# Patient Record
Sex: FEMALE | Race: WHITE | NOT HISPANIC OR LATINO | URBAN - METROPOLITAN AREA
[De-identification: names, ages, dates, MRNs, and addresses within clinical notes are randomized per-mention and may not be internally consistent; named-entity substitution may affect disease eponyms.]

---

## 2023-09-25 PROBLEM — K58.9 IRRITABLE BOWEL SYNDROME WITHOUT DIARRHEA: Status: ACTIVE | Noted: 2023-09-25

## 2023-09-25 PROBLEM — R87.810 CERVICAL HIGH RISK HUMAN PAPILLOMAVIRUS (HPV) DNA TEST POSITIVE: Status: ACTIVE | Noted: 2023-09-25

## 2023-09-25 RX ORDER — ESCITALOPRAM OXALATE 10 MG/1
1 TABLET ORAL NIGHTLY
COMMUNITY
Start: 2023-08-22 | End: 2024-04-29 | Stop reason: WASHOUT

## 2023-09-25 RX ORDER — FAMOTIDINE 20 MG/1
TABLET, FILM COATED ORAL
COMMUNITY

## 2023-09-25 RX ORDER — ONDANSETRON 4 MG/1
TABLET, ORALLY DISINTEGRATING ORAL
COMMUNITY
Start: 2017-11-29 | End: 2024-04-29 | Stop reason: WASHOUT

## 2023-09-25 RX ORDER — ALBUTEROL SULFATE 90 UG/1
AEROSOL, METERED RESPIRATORY (INHALATION)
COMMUNITY

## 2023-09-25 RX ORDER — LEVONORGESTREL AND ETHINYL ESTRADIOL 0.15-0.03
KIT ORAL
COMMUNITY
End: 2023-10-19 | Stop reason: SDUPTHER

## 2023-10-04 ENCOUNTER — APPOINTMENT (OUTPATIENT)
Dept: OBSTETRICS AND GYNECOLOGY | Facility: CLINIC | Age: 28
End: 2023-10-04
Payer: COMMERCIAL

## 2023-10-19 DIAGNOSIS — Z30.41 SURVEILLANCE FOR BIRTH CONTROL, ORAL CONTRACEPTIVES: Primary | ICD-10-CM

## 2023-10-19 NOTE — TELEPHONE ENCOUNTER
Pt needs to have her rx sent to [facility] instead of local pharm. Please send new 90d with 1r to [facility].

## 2023-10-21 RX ORDER — LEVONORGESTREL AND ETHINYL ESTRADIOL 0.15-0.03
KIT ORAL
Qty: 84 TABLET | Refills: 3 | Status: SHIPPED | OUTPATIENT
Start: 2023-10-21 | End: 2024-06-03 | Stop reason: WASHOUT

## 2024-03-03 ENCOUNTER — HOSPITAL ENCOUNTER (EMERGENCY)
Facility: HOSPITAL | Age: 29
Discharge: HOME | End: 2024-03-03
Payer: COMMERCIAL

## 2024-03-03 VITALS
RESPIRATION RATE: 17 BRPM | TEMPERATURE: 98.4 F | SYSTOLIC BLOOD PRESSURE: 111 MMHG | HEIGHT: 63 IN | HEART RATE: 87 BPM | OXYGEN SATURATION: 96 % | BODY MASS INDEX: 29.23 KG/M2 | DIASTOLIC BLOOD PRESSURE: 79 MMHG | WEIGHT: 165 LBS

## 2024-03-03 DIAGNOSIS — Z23 NEED FOR TDAP VACCINATION: ICD-10-CM

## 2024-03-03 DIAGNOSIS — S61.211A LACERATION OF LEFT INDEX FINGER WITHOUT FOREIGN BODY WITHOUT DAMAGE TO NAIL, INITIAL ENCOUNTER: Primary | ICD-10-CM

## 2024-03-03 PROCEDURE — 90715 TDAP VACCINE 7 YRS/> IM: CPT | Performed by: PHYSICIAN ASSISTANT

## 2024-03-03 PROCEDURE — 2500000001 HC RX 250 WO HCPCS SELF ADMINISTERED DRUGS (ALT 637 FOR MEDICARE OP): Performed by: PHYSICIAN ASSISTANT

## 2024-03-03 PROCEDURE — 12001 RPR S/N/AX/GEN/TRNK 2.5CM/<: CPT | Performed by: PHYSICIAN ASSISTANT

## 2024-03-03 PROCEDURE — 99283 EMERGENCY DEPT VISIT LOW MDM: CPT | Mod: 25

## 2024-03-03 PROCEDURE — 2500000004 HC RX 250 GENERAL PHARMACY W/ HCPCS (ALT 636 FOR OP/ED): Performed by: PHYSICIAN ASSISTANT

## 2024-03-03 PROCEDURE — 90471 IMMUNIZATION ADMIN: CPT | Performed by: PHYSICIAN ASSISTANT

## 2024-03-03 RX ORDER — BACITRACIN ZINC 500 UNIT/G
1 OINTMENT (GRAM) TOPICAL 2 TIMES DAILY
Qty: 14 G | Refills: 0 | Status: SHIPPED | OUTPATIENT
Start: 2024-03-03 | End: 2024-03-13

## 2024-03-03 RX ORDER — BACITRACIN ZINC 500 UNIT/G
1 OINTMENT IN PACKET (EA) TOPICAL ONCE
Status: COMPLETED | OUTPATIENT
Start: 2024-03-03 | End: 2024-03-03

## 2024-03-03 RX ORDER — CEPHALEXIN 500 MG/1
500 CAPSULE ORAL 3 TIMES DAILY
Qty: 15 CAPSULE | Refills: 0 | Status: SHIPPED | OUTPATIENT
Start: 2024-03-03 | End: 2024-03-08

## 2024-03-03 RX ADMIN — BACITRACIN 1 APPLICATION: 500 OINTMENT TOPICAL at 10:46

## 2024-03-03 RX ADMIN — TETANUS TOXOID, REDUCED DIPHTHERIA TOXOID AND ACELLULAR PERTUSSIS VACCINE, ADSORBED 0.5 ML: 5; 2.5; 8; 8; 2.5 SUSPENSION INTRAMUSCULAR at 10:44

## 2024-03-03 ASSESSMENT — COLUMBIA-SUICIDE SEVERITY RATING SCALE - C-SSRS
1. IN THE PAST MONTH, HAVE YOU WISHED YOU WERE DEAD OR WISHED YOU COULD GO TO SLEEP AND NOT WAKE UP?: NO
2. HAVE YOU ACTUALLY HAD ANY THOUGHTS OF KILLING YOURSELF?: NO
6. HAVE YOU EVER DONE ANYTHING, STARTED TO DO ANYTHING, OR PREPARED TO DO ANYTHING TO END YOUR LIFE?: NO

## 2024-03-03 ASSESSMENT — PAIN - FUNCTIONAL ASSESSMENT: PAIN_FUNCTIONAL_ASSESSMENT: 0-10

## 2024-03-03 ASSESSMENT — PAIN SCALES - GENERAL: PAINLEVEL_OUTOF10: 5 - MODERATE PAIN

## 2024-03-03 NOTE — ED PROVIDER NOTES
HPI   Chief Complaint   Patient presents with    Finger Laceration     Pt arrived to ED with c/o finger laceration on second digit of left hand lateral aspect. Bleeding under control. Pt alert and oriented x3. Ambulated to triage.       28-year-old female with a laceration of her left second digit that occurred prior to arrival she was cutting sourdough bread.  No other injury nothing makes her better or worse.  Unsure of her last tetanus vaccination.                          No data recorded                   Patient History   Past Medical History:   Diagnosis Date    Personal history of other diseases of the respiratory system     History of asthma     No past surgical history on file.  Family History   Problem Relation Name Age of Onset    Hyperlipidemia Mother      No Known Problems Sister      No Known Problems Brother      Breast cancer Maternal Grandmother      Breast cancer Other          grandmother and great aunt    Breast cancer Maternal Great-Grandmother       Social History     Tobacco Use    Smoking status: Not on file    Smokeless tobacco: Not on file   Substance Use Topics    Alcohol use: Not on file    Drug use: Not on file       Physical Exam   ED Triage Vitals [03/03/24 0957]   Temperature Heart Rate Respirations BP   36.9 °C (98.4 °F) 87 17 111/79      Pulse Ox Temp Source Heart Rate Source Patient Position   96 % Temporal -- --      BP Location FiO2 (%)     -- --       Physical Exam  Vitals reviewed.   Constitutional:       General: She is not in acute distress.     Appearance: Normal appearance. She is normal weight. She is not ill-appearing, toxic-appearing or diaphoretic.   HENT:      Head: Normocephalic and atraumatic.      Right Ear: External ear normal.      Left Ear: External ear normal.      Nose: Nose normal.   Eyes:      Extraocular Movements: Extraocular movements intact.      Conjunctiva/sclera: Conjunctivae normal.      Pupils: Pupils are equal, round, and reactive to light.    Cardiovascular:      Rate and Rhythm: Normal rate.   Pulmonary:      Effort: Pulmonary effort is normal. No respiratory distress.      Breath sounds: No stridor.   Abdominal:      General: There is no distension.   Musculoskeletal:         General: Signs of injury present. No swelling, tenderness or deformity.        Hands:       Cervical back: Normal range of motion.   Skin:     General: Skin is warm.      Capillary Refill: Capillary refill takes less than 2 seconds.      Findings: No rash.   Neurological:      General: No focal deficit present.      Mental Status: She is alert and oriented to person, place, and time. Mental status is at baseline.   Psychiatric:         Mood and Affect: Mood normal.         Behavior: Behavior normal.         Thought Content: Thought content normal.         Judgment: Judgment normal.         ED Course & MDM   Diagnoses as of 03/03/24 1546   Laceration of left index finger without foreign body without damage to nail, initial encounter   Need for Tdap vaccination       Medical Decision Making  Differential is laceration, need for Tdap, infectious process,    Wound repaired and explored ultimately tetanus updated patient given referral to hand as needed but otherwise follow-up with primary care for suture removal in 10 days        Procedure  Laceration Repair    Performed by: Arnie Plunkett PA-C  Authorized by: Arnie Plunkett PA-C    Consent:     Consent obtained:  Verbal    Consent given by:  Patient    Risks, benefits, and alternatives were discussed: yes      Risks discussed:  Pain, infection, need for additional repair, poor cosmetic result, tendon damage and poor wound healing    Alternatives discussed:  No treatment  Universal protocol:     Procedure explained and questions answered to patient or proxy's satisfaction: yes      Immediately prior to procedure, a time out was called: yes      Patient identity confirmed:  Verbally with patient and provided demographic  data  Anesthesia:     Anesthesia method:  Local infiltration    Local anesthetic:  Lidocaine 1% w/o epi  Laceration details:     Location:  Finger    Finger location:  L index finger    Length (cm):  2  Pre-procedure details:     Preparation:  Patient was prepped and draped in usual sterile fashion  Exploration:     Hemostasis achieved with:  Direct pressure    Wound extent: no foreign bodies/material noted, no muscle damage noted and no tendon damage noted      Contaminated: no    Treatment:     Area cleansed with:  Saline and chlorhexidine    Amount of cleaning:  Standard  Skin repair:     Repair method:  Sutures    Suture size:  4-0    Suture technique:  Simple interrupted    Number of sutures:  3  Repair type:     Repair type:  Simple  Post-procedure details:     Dressing:  Splint for protection and non-adherent dressing    Procedure completion:  Tolerated well, no immediate complications       Arnie Plunkett PA-C  03/03/24 1552       Arnie Plunkett PA-C  03/03/24 1551

## 2024-03-27 ENCOUNTER — HOSPITAL ENCOUNTER (OUTPATIENT)
Dept: RADIOLOGY | Facility: CLINIC | Age: 29
Discharge: HOME | End: 2024-03-27
Payer: COMMERCIAL

## 2024-03-27 ENCOUNTER — OFFICE VISIT (OUTPATIENT)
Dept: ORTHOPEDIC SURGERY | Facility: CLINIC | Age: 29
End: 2024-03-27
Payer: COMMERCIAL

## 2024-03-27 VITALS — BODY MASS INDEX: 29.21 KG/M2 | WEIGHT: 164.9 LBS

## 2024-03-27 DIAGNOSIS — S61.211A LACERATION OF LEFT INDEX FINGER, INITIAL ENCOUNTER: Primary | ICD-10-CM

## 2024-03-27 DIAGNOSIS — S61.219A FINGER LACERATION: ICD-10-CM

## 2024-03-27 PROCEDURE — 99213 OFFICE O/P EST LOW 20 MIN: CPT | Performed by: ORTHOPAEDIC SURGERY

## 2024-03-27 PROCEDURE — 99203 OFFICE O/P NEW LOW 30 MIN: CPT | Performed by: ORTHOPAEDIC SURGERY

## 2024-03-27 PROCEDURE — 73130 X-RAY EXAM OF HAND: CPT | Mod: LEFT SIDE | Performed by: ORTHOPAEDIC SURGERY

## 2024-03-27 PROCEDURE — 73130 X-RAY EXAM OF HAND: CPT | Mod: LT

## 2024-03-27 PROCEDURE — 1036F TOBACCO NON-USER: CPT | Performed by: ORTHOPAEDIC SURGERY

## 2024-03-27 ASSESSMENT — PATIENT HEALTH QUESTIONNAIRE - PHQ9
1. LITTLE INTEREST OR PLEASURE IN DOING THINGS: NOT AT ALL
SUM OF ALL RESPONSES TO PHQ9 QUESTIONS 1 AND 2: 0
2. FEELING DOWN, DEPRESSED OR HOPELESS: NOT AT ALL

## 2024-03-27 ASSESSMENT — PAIN DESCRIPTION - DESCRIPTORS: DESCRIPTORS: ACHING

## 2024-03-27 ASSESSMENT — PAIN SCALES - GENERAL: PAINLEVEL_OUTOF10: 0 - NO PAIN

## 2024-03-27 ASSESSMENT — ENCOUNTER SYMPTOMS
DEPRESSION: 0
LOSS OF SENSATION IN FEET: 0
OCCASIONAL FEELINGS OF UNSTEADINESS: 0

## 2024-03-27 ASSESSMENT — PAIN - FUNCTIONAL ASSESSMENT: PAIN_FUNCTIONAL_ASSESSMENT: 0-10

## 2024-03-27 NOTE — PROGRESS NOTES
Subjective      Chief Complaint   Patient presents with    Left Hand - Follow-up     Left  index finger laceration        No surgery found     HPI  This 28-year-old young woman lacerated her left index finger on approximately 3-3-2024 while cutting bread.  She was seen at  emergency department she was evaluated and the laceration was cleansed, was sutured and splinted.  She comes in today in the presence of her mother for discussion regarding further treatment options.  She states that her left index finger pain is now markedly improved and is only a 1/10 and is worse with and aggravated by movement.    CARDIOLOGY:   Negative for chest pain, shortness of breath.   RESPIRATORY:   Negative for chest pain, shortness of breath.   MUSCULOSKELETAL:   See HPI for details.   NEUROLOGY:   Negative for tingling, numbness, weakness.    Objective    There were no vitals filed for this visit.    Physical Exam  GENERAL:          General Appearance:  This is a pleasant patient with appropriate affect, in no acute distress.   DERMATOLOGY:          Skin: skin at the neck, upper and lower back, and trunk is intact. There is no evidence of skin rash, skin breakdown or ulceration, or atrophic skin change.   EXTREMITIES:          Vascular:  Right, left hands and feet are warm with good color and pulses. Right and left calf and thigh are nontender and nonswollen.   NEUROLOGICAL:          Orientation:  Patient is alert and oriented to person, place, time and situation. Right and left upper and lower extremity motor and sensory examinations are intact.      MUSCULOSKELETAL: Neck: Nontender. No pain with range of motion.  Left index finger: The splint is removed.  There is a clean, dry, healed laceration at the PIP joint of the left index finger dorsally.  Sutures have been removed.  There is no drainage.  The patient is able to actively extend and actively flex the left index finger fully.  Sensation is intact.  Color is good.  X-rays of  the left index finger done and read in the office today do not show any evidence of fracture.    No results found.     Kita was seen today for follow-up.  Diagnoses and all orders for this visit:  Laceration of left index finger, initial encounter (Primary)  Options are discussed with the patient in presence of her mother in detail the patient is instructed regarding activity modification and risk for further injury with falling or trauma, ice, infection precautions and keeping this laceration clean, dry and covered, splinting at night and physician directed at home gentle  ROM exercises, and the appropriate use of Tylenol as needed for pain with its potential adverse reactions and side effects. The patient understands.  Return in 2 months for reevaluation or sooner if the patient notices any lack of ability to actively extend her left index finger.  Please note that this report has been produced using speech recognition software.  It may contain errors related to grammar, punctuation or spelling.  Electronically signed, but not reviewed.    Glenn Luevano MD

## 2024-04-29 ENCOUNTER — TELEPHONE (OUTPATIENT)
Dept: OBSTETRICS AND GYNECOLOGY | Facility: CLINIC | Age: 29
End: 2024-04-29
Payer: COMMERCIAL

## 2024-04-29 NOTE — TELEPHONE ENCOUNTER
Est pt calling with c/o rapid weight gain. Pt states she seen PCP who did cbc, cmp and TSH all wnl. Pt will be completing US on Monday. Please see Visit under encounters with [facility] provider. Appt made for 05/22.

## 2024-05-06 ENCOUNTER — HOSPITAL ENCOUNTER (OUTPATIENT)
Dept: RADIOLOGY | Facility: HOSPITAL | Age: 29
Discharge: HOME | End: 2024-05-06
Payer: COMMERCIAL

## 2024-05-06 DIAGNOSIS — Z76.89 PERSONS ENCOUNTERING HEALTH SERVICES IN OTHER SPECIFIED CIRCUMSTANCES: ICD-10-CM

## 2024-05-06 PROCEDURE — 76705 ECHO EXAM OF ABDOMEN: CPT | Performed by: RADIOLOGY

## 2024-05-06 PROCEDURE — 76705 ECHO EXAM OF ABDOMEN: CPT

## 2024-05-22 ENCOUNTER — OFFICE VISIT (OUTPATIENT)
Dept: OBSTETRICS AND GYNECOLOGY | Facility: CLINIC | Age: 29
End: 2024-05-22
Payer: COMMERCIAL

## 2024-05-22 VITALS
WEIGHT: 174 LBS | BODY MASS INDEX: 30.83 KG/M2 | DIASTOLIC BLOOD PRESSURE: 82 MMHG | SYSTOLIC BLOOD PRESSURE: 128 MMHG | HEIGHT: 63 IN

## 2024-05-22 DIAGNOSIS — Z76.89 ESTABLISHING CARE WITH NEW DOCTOR, ENCOUNTER FOR: ICD-10-CM

## 2024-05-22 DIAGNOSIS — L68.0 HIRSUTISM: ICD-10-CM

## 2024-05-22 DIAGNOSIS — Z30.41 SURVEILLANCE FOR BIRTH CONTROL, ORAL CONTRACEPTIVES: ICD-10-CM

## 2024-05-22 DIAGNOSIS — F32.81 PMDD (PREMENSTRUAL DYSPHORIC DISORDER): ICD-10-CM

## 2024-05-22 DIAGNOSIS — F39 MOOD DISORDER (CMS-HCC): Primary | ICD-10-CM

## 2024-05-22 DIAGNOSIS — R63.5 ABNORMAL WEIGHT GAIN: ICD-10-CM

## 2024-05-22 PROCEDURE — 99214 OFFICE O/P EST MOD 30 MIN: CPT | Performed by: OBSTETRICS & GYNECOLOGY

## 2024-05-22 RX ORDER — DROSPIRENONE, ETHINYL ESTRADIOL AND LEVOMEFOLATE CALCIUM AND LEVOMEFOLATE CALCIUM 3-0.02(24)
1 KIT ORAL DAILY
Qty: 28 TABLET | Refills: 11 | Status: SHIPPED | OUTPATIENT
Start: 2024-05-22 | End: 2024-06-03 | Stop reason: WASHOUT

## 2024-05-22 ASSESSMENT — LIFESTYLE VARIABLES
HOW OFTEN DO YOU HAVE SIX OR MORE DRINKS ON ONE OCCASION: NEVER
HOW MANY STANDARD DRINKS CONTAINING ALCOHOL DO YOU HAVE ON A TYPICAL DAY: PATIENT DOES NOT DRINK
AUDIT-C TOTAL SCORE: 0
HOW OFTEN DO YOU HAVE A DRINK CONTAINING ALCOHOL: NEVER
SKIP TO QUESTIONS 9-10: 1

## 2024-05-22 ASSESSMENT — PATIENT HEALTH QUESTIONNAIRE - PHQ9
1. LITTLE INTEREST OR PLEASURE IN DOING THINGS: NOT AT ALL
2. FEELING DOWN, DEPRESSED OR HOPELESS: NOT AT ALL
SUM OF ALL RESPONSES TO PHQ9 QUESTIONS 1 & 2: 0

## 2024-05-22 ASSESSMENT — PAIN SCALES - GENERAL: PAINLEVEL: 0-NO PAIN

## 2024-05-22 ASSESSMENT — ENCOUNTER SYMPTOMS
LOSS OF SENSATION IN FEET: 0
DEPRESSION: 0
OCCASIONAL FEELINGS OF UNSTEADINESS: 0

## 2024-05-22 NOTE — PROGRESS NOTES
GYN OFFICE VISIT    Patient Name:  Kita Watt  :  1995  [facility]  [id number]  [id number]      ASSESSMENT/PLAN:   1. Mood disorder (CMS-HCC)    - Referral to Primary Care; Future    2. PMDD (premenstrual dysphoric disorder)    - Referral to Primary Care; Future  - drospirenone-e.estradioL-lm.FA (Nancy Preston) 3-0.02-0.451 mg (24) (4); Take 1 tablet by mouth once daily.  Dispense: 28 tablet; Refill: 11  - Testosterone, total and free; Future  - FSH; Future  - Luteinizing hormone; Future  - DHEA; Future  - DHEA-Sulfate; Future  - Androstenedione; Future  - Cortisol AM; Future  - Prolactin; Future    3. Surveillance for birth control, oral contraceptives    - drospirenone-e.estradioL-lm.FA (Nancy Preston) 3-0.02-0.451 mg (24) (4); Take 1 tablet by mouth once daily.  Dispense: 28 tablet; Refill: 11    4. Abnormal weight gain    - Referral to Primary Care; Future  - drospirenone-e.estradioL-lm.FA (Nancy Preston) 3-0.02-0.451 mg (24) (4); Take 1 tablet by mouth once daily.  Dispense: 28 tablet; Refill: 11  - Testosterone, total and free; Future  - FSH; Future  - Luteinizing hormone; Future  - DHEA; Future  - DHEA-Sulfate; Future  - Androstenedione; Future  - Cortisol AM; Future  - Prolactin; Future    5. Hirsutism    - Referral to Primary Care; Future  - drospirenone-e.estradioL-lm.FA (Nancy Preston) 3-0.02-0.451 mg (24) (4); Take 1 tablet by mouth once daily.  Dispense: 28 tablet; Refill: 11  - Testosterone, total and free; Future  - FSH; Future  - Luteinizing hormone; Future  - DHEA; Future  - DHEA-Sulfate; Future  - Androstenedione; Future  - Cortisol AM; Future  - Prolactin; Future    6. Establishing care with new doctor, encounter for    - Referral to Primary Care; Future       -Medication education:   All new and/or current medications discussed and reviewed including side effects with patient/caregiver, Understanding:  Caregiver/Patient expressed understanding., Adherence:  Barriers to adherence  identified and discussed if present.  -Preventative hygiene reviewed.   -Options reviewed. Will try Beyaz to cover multiple symptoms  -Skin care reviewed.    RTO in 3 months for annual with pap and FU      Chief Complaint   Patient presents with    Weight Gain     Kita Watt is a 28 y.o. C. Female who presents for weight gain   Patient's last menstrual period was 04/22/2024 (exact date)..  Since January (was on antidepressants). She has been fighting anxiety and depression more since 30lbs gained in about 3-4 months. Not sure if she is being heard. Initial blood work normal (not hormonal).  LBP - chronic,  getting worse.  Skin - a little more fuzz under her chin.  She manages hair over lip.  Periods are regular on the OCP.  She has PMDD.  No dyspareunia out of concern. She uses condoms.  She does not want children at this time. No serious relationship.     Past Medical History:   Diagnosis Date    Abnormal Pap smear of cervix     HPV (human papilloma virus) infection     Laceration of index finger 03/03/2024    left    Personal history of other diseases of the respiratory system     History of asthma       Social History     Tobacco Use    Smoking status: Never    Smokeless tobacco: Never   Vaping Use    Vaping status: Never Used   Substance Use Topics    Alcohol use: Never    Drug use: Yes     Types: Marijuana     Comment: Recreationally. Maybe once every 2 weeks        Family History   Problem Relation Name Age of Onset    Hyperlipidemia Mother Rachel     No Known Problems Sister      No Known Problems Brother      Breast cancer Maternal Grandmother Grandma silvia     Arthritis Maternal Grandmother Grandma silvia     COPD Maternal Grandmother Grandma silvia     Breast cancer Maternal Great-Grandmother      Breast cancer Other          grandmother and great aunt    Cancer Paternal Grandfather pa caron          Current Outpatient Medications:     albuterol 90 mcg/actuation inhaler, Albuterol 90 MCG/ACT AERS   "Refills: 0     Active, Disp: , Rfl:     famotidine (Pepcid) 20 mg tablet, 1 tablet at bedtime as needed Orally Once a day for 30 day(s), Disp: , Rfl:     levonorgestreL-ethinyl estrad (Altavera, 28,) 0.15-0.03 mg tablet, take 1 tablet once daily Orally for 84 days, Disp: 84 tablet, Rfl: 3    mv-min/iron/folic/calcium/vitK (WOMEN'S MULTIVITAMIN ORAL), Take 1 Each by mouth five times a week., Disp: , Rfl:      No Known Allergies       ROS:  WHS - GENERAL:          Chills no.  Fever no.  Loss of Weight no.  Sweats no.  Fatigue no.  Weight gain 10 lbs- short timeframe    WHS - RESPIRATORY:          Shortness of breath no.  WHS - CARDIOVASCULAR:          Chest Pain no.  High Blood Pressure no.  Irregular Heart Beat no.  Low Blood Pressure no.  Rapid Heart Beat no.  Swelling of ankles no.   WHS - GASTROINTESTINAL:          Appetite Poor no.  Bloating no.  Constipation no.  Diarrhea no.  Hemorrhoids no.  Indigestion no.  Nausea no.  Rectal Bleeding no.  Stomach Pain no  Vomiting no.  Vomiting Blood no.      WHS - GENITO-URINARY:          Blood in Urine no.  Frequent Urination no.  Lack of Bladder Control no.  Painful Urination no.  Heavy/Irregular periods no.  Vaginal discharge no.  Vaginal odor no.  Vaginal itching no.  Post-coital bleeding no.      WHS - MUSCLE/JOINT/BONE:          Back no.  Joint pain yes.  Muscle pain no.      WHS - SKIN:          Bruise easily no.  Itching no.  Change in Moles no.  Rash no.  Sore that won't heal no.  Vulvar Lesions no.  Acne yes; manages excess hair  WHS - ROS Update:          Depression or anxiety problems Significant mood swings near period      OBJECTIVE:   /82   Ht 1.6 m (5' 3\")   Wt 78.9 kg (174 lb)   LMP 04/22/2024 (Exact Date)   BMI 30.82 kg/m²   Body mass index is 30.82 kg/m².     Physical Exam  GENERAL:   General Appearance:  well-developed, mild obesity.  well-groomed.  Hygiene:  good.  Ill-appearance:  none.    HEENT: NC/AT head.  Neck is supple.  Speech:  clear.  " Eye contact:  normal.  LUNGS:  Normal effort; no respiratory distress.    HEART: RRR with S1/S2 w/o M/C/R  ABDOMEN: Tenderness:  none.  Distention:  none.   GENITOURINARY - deferred  DERMATOLOGY:  Skin:  various stages of acne  EXTREMITIES: Normal:  no anomalies.  Edema:  none.    NEUROLOGICAL: Orientation:  alert and oriented x 3.   PSYCHOLOGY: Affect:  appropriate.  Mood:  pleasant.     Previous/current LABS reviewed: Yes  Previous/current RADIOLOGY reviewed: Yes    Note: This dictation was generated using Dragon voice recognition software. Please excuse any grammatical or spelling errors that may have occurred using the system.

## 2024-06-03 ENCOUNTER — OFFICE VISIT (OUTPATIENT)
Dept: PRIMARY CARE | Facility: CLINIC | Age: 29
End: 2024-06-03
Payer: COMMERCIAL

## 2024-06-03 VITALS
HEIGHT: 63 IN | DIASTOLIC BLOOD PRESSURE: 80 MMHG | BODY MASS INDEX: 30.72 KG/M2 | OXYGEN SATURATION: 98 % | TEMPERATURE: 98.7 F | SYSTOLIC BLOOD PRESSURE: 110 MMHG | HEART RATE: 91 BPM | WEIGHT: 173.4 LBS

## 2024-06-03 DIAGNOSIS — Z76.89 ENCOUNTER FOR WEIGHT MANAGEMENT: Primary | ICD-10-CM

## 2024-06-03 PROCEDURE — 99213 OFFICE O/P EST LOW 20 MIN: CPT | Performed by: FAMILY MEDICINE

## 2024-06-03 ASSESSMENT — LIFESTYLE VARIABLES: HOW MANY STANDARD DRINKS CONTAINING ALCOHOL DO YOU HAVE ON A TYPICAL DAY: PATIENT DOES NOT DRINK

## 2024-06-03 ASSESSMENT — PAIN SCALES - GENERAL: PAINLEVEL: 0-NO PAIN

## 2024-06-03 NOTE — PROGRESS NOTES
"History Of Present Illness  Kita Watt is a 28 y.o. female presenting for Establish Care (NP/ Est Care- Discuss recent weight gain- 30lbs within 3-5months.)  .    HPI   Gained 30 lbs during the time she was on Lexapro. Weaned off it about 3 months ago and weight has plateaued. However, she expected the weight to\"melt off\" and has not. Diet unrestricted.   Has protien shake and english muffin for breakfast; snacks throughout day on cheese pretzel or other snack that is cheesey; eats dinner. Works out walking daily or HIIT routine a few days a week.     Past Medical History  Patient Active Problem List    Diagnosis Date Noted    Laceration of left index finger, initial encounter 03/27/2024    Cervical high risk human papillomavirus (HPV) DNA test positive 09/25/2023    Irritable bowel syndrome without diarrhea 09/25/2023        Medications  Current Outpatient Medications on File Prior to Visit   Medication Sig    albuterol 90 mcg/actuation inhaler Albuterol 90 MCG/ACT AERS   Refills: 0       Active    famotidine (Pepcid) 20 mg tablet 1 tablet at bedtime as needed Orally Once a day for 30 day(s)    [DISCONTINUED] mv-min/iron/folic/calcium/vitK (WOMEN'S MULTIVITAMIN ORAL) Take 1 Each by mouth five times a week.    [DISCONTINUED] drospirenone-e.estradioL-lm.FA (Nancy Preston) 3-0.02-0.451 mg (24) (4) Take 1 tablet by mouth once daily.    [DISCONTINUED] levonorgestreL-ethinyl estrad (Altavera, Ezequiel,) 0.15-0.03 mg tablet take 1 tablet once daily Orally for 84 days     No current facility-administered medications on file prior to visit.        Surgical History  She has no past surgical history on file.     Social History  She reports that she has never smoked. She has never used smokeless tobacco. She reports current drug use. Drug: Marijuana. She reports that she does not drink alcohol.    Family History  Family History   Problem Relation Name Age of Onset    Hyperlipidemia Mother Rachel     No Known Problems Sister      " "No Known Problems Brother      Breast cancer Maternal Grandmother Grandma silvia     Arthritis Maternal Grandmother Grandma silvia     COPD Maternal Grandmother Grandma silvia     Breast cancer Maternal Great-Grandmother      Breast cancer Other          grandmother and great aunt    Cancer Paternal Grandfather Grandpa caron         Allergies  Patient has no known allergies.    Immunizations  Immunization History   Administered Date(s) Administered    Influenza, injectable, quadrivalent 09/18/2018    Influenza, seasonal, injectable 09/12/2018    Pfizer Gray Cap SARS-CoV-2 02/18/2022    Pfizer Purple Cap SARS-CoV-2 04/17/2021, 05/08/2021    Tdap vaccine, age 7 year and older (BOOSTRIX, ADACEL) 03/11/2016, 03/03/2024        ROS  Negative, except as discussed in HPI     Vitals  /80   Pulse 91   Temp 37.1 °C (98.7 °F)   Ht 1.6 m (5' 3\")   Wt 78.7 kg (173 lb 6.4 oz)   LMP 05/22/2024 (Approximate)   SpO2 98%   BMI 30.72 kg/m²      Physical Exam  Vitals and nursing note reviewed.   Constitutional:       General: She is not in acute distress.     Appearance: Normal appearance.   Cardiovascular:      Rate and Rhythm: Normal rate and regular rhythm.      Heart sounds: Normal heart sounds.   Pulmonary:      Effort: No respiratory distress.      Breath sounds: Normal breath sounds.   Neurological:      General: No focal deficit present.      Mental Status: She is alert. Mental status is at baseline.         Relevant Results  Lab Results   Component Value Date    WBC 5.5 11/04/2019    WBC 3.5 (L) 09/23/2019    HGB 12.7 11/04/2019    HGB 13.3 09/23/2019    HCT 36.6 11/04/2019    HCT 39.2 09/23/2019    MCV 88 11/04/2019    MCV 89 09/23/2019     11/04/2019     09/23/2019     Lab Results   Component Value Date     11/04/2019     09/23/2019    K 3.5 11/04/2019    K 4.1 09/23/2019     11/04/2019     09/23/2019    CO2 24 11/04/2019    CO2 29 09/23/2019    BUN 8 11/04/2019    BUN 10 " "09/23/2019    CREATININE 0.90 11/04/2019    CREATININE 0.83 09/23/2019    CALCIUM 9.5 11/04/2019    CALCIUM 9.9 09/23/2019    PROT 7.5 09/23/2019    PROT 7.6 02/15/2019    BILITOT 1.7 (H) 09/23/2019    BILITOT 0.9 02/15/2019    ALKPHOS 50 09/23/2019    ALKPHOS 58 02/15/2019    ALT 10 09/23/2019    ALT 11 02/15/2019    AST 12 09/23/2019    AST 14 02/15/2019    GLUCOSE 99 11/04/2019    GLUCOSE 85 09/23/2019     No results found for: \"HGBA1C\"  Lab Results   Component Value Date    TSH 1.76 09/23/2019      Lab Results   Component Value Date    CHOL 188 09/23/2019    TRIG 75 09/23/2019    HDL 50.3 09/23/2019           Assessment/Plan   Kita was seen today for establish care.  Diagnoses and all orders for this visit:  Encounter for weight management (Primary)  Comments:  discussed dietary changes for weight loss         Counseling:   Medication education:   -Education:  The patient is counseled regarding potential side-effects of any and all new medications  -Understanding:  Patient expressed understanding of information discussed today  -Adherence:  No barriers to adherence identified    Final treatment plan is a result of shared decision making with patient.         Cesar Whitfield MD   "

## 2024-06-19 ENCOUNTER — APPOINTMENT (OUTPATIENT)
Dept: OBSTETRICS AND GYNECOLOGY | Facility: CLINIC | Age: 29
End: 2024-06-19
Payer: COMMERCIAL

## 2024-06-21 ENCOUNTER — LAB (OUTPATIENT)
Dept: LAB | Facility: LAB | Age: 29
End: 2024-06-21
Payer: COMMERCIAL

## 2024-06-21 DIAGNOSIS — R63.5 ABNORMAL WEIGHT GAIN: ICD-10-CM

## 2024-06-21 DIAGNOSIS — F32.81 PMDD (PREMENSTRUAL DYSPHORIC DISORDER): ICD-10-CM

## 2024-06-21 DIAGNOSIS — L68.0 HIRSUTISM: ICD-10-CM

## 2024-06-21 LAB
CORTIS AM PEAK SERPL-MSCNC: 13.3 UG/DL (ref 5–20)
DHEA-S SERPL-MCNC: 156 UG/DL (ref 65–395)
FSH SERPL-ACNC: 8 IU/L
LH SERPL-ACNC: 3.4 IU/L
PROLACTIN SERPL-MCNC: 10.3 UG/L (ref 3–20)

## 2024-06-21 PROCEDURE — 83002 ASSAY OF GONADOTROPIN (LH): CPT

## 2024-06-21 PROCEDURE — 84402 ASSAY OF FREE TESTOSTERONE: CPT

## 2024-06-21 PROCEDURE — 83001 ASSAY OF GONADOTROPIN (FSH): CPT

## 2024-06-21 PROCEDURE — 36415 COLL VENOUS BLD VENIPUNCTURE: CPT

## 2024-06-21 PROCEDURE — 82157 ASSAY OF ANDROSTENEDIONE: CPT

## 2024-06-21 PROCEDURE — 84146 ASSAY OF PROLACTIN: CPT

## 2024-06-21 PROCEDURE — 82627 DEHYDROEPIANDROSTERONE: CPT

## 2024-06-21 PROCEDURE — 82626 DEHYDROEPIANDROSTERONE: CPT

## 2024-06-21 PROCEDURE — 82533 TOTAL CORTISOL: CPT

## 2024-06-22 DIAGNOSIS — L68.0 HIRSUTISM: ICD-10-CM

## 2024-06-22 DIAGNOSIS — F32.81 PMDD (PREMENSTRUAL DYSPHORIC DISORDER): Primary | ICD-10-CM

## 2024-06-22 DIAGNOSIS — Z30.41 SURVEILLANCE FOR BIRTH CONTROL, ORAL CONTRACEPTIVES: ICD-10-CM

## 2024-06-22 DIAGNOSIS — R63.5 ABNORMAL WEIGHT GAIN: ICD-10-CM

## 2024-06-22 RX ORDER — DROSPIRENONE, ETHINYL ESTRADIOL AND LEVOMEFOLATE CALCIUM AND LEVOMEFOLATE CALCIUM 3-0.02(24)
1 KIT ORAL DAILY
Qty: 28 TABLET | Refills: 11 | Status: SHIPPED | OUTPATIENT
Start: 2024-06-22

## 2024-06-22 NOTE — PROGRESS NOTES
Called with complaints of to the pharmacy for birth control and it had been canceled.  BS was prescribed on May 22 by this provider.  It was discontinued on [place] 3 by her Dr. Whitfield.  Instead they did not discuss birth control.  Prescription was renewed at this time for her.

## 2024-06-26 LAB
ANDROST SERPL-MCNC: 0.63 NG/ML (ref 0.26–2.14)
DHEA SERPL-MCNC: 2.48 NG/ML (ref 1.33–7.78)

## 2024-06-27 LAB
TESTOSTERONE FREE (CHAN): 2.1 PG/ML (ref 0.1–6.4)
TESTOSTERONE,TOTAL,LC-MS/MS: 21 NG/DL (ref 2–45)

## 2024-08-05 DIAGNOSIS — Z30.41 SURVEILLANCE FOR BIRTH CONTROL, ORAL CONTRACEPTIVES: Primary | ICD-10-CM

## 2024-08-05 RX ORDER — LEVONORGESTREL AND ETHINYL ESTRADIOL 0.15-0.03
1 KIT ORAL DAILY
COMMUNITY
End: 2024-08-05 | Stop reason: SDUPTHER

## 2024-08-07 RX ORDER — LEVONORGESTREL AND ETHINYL ESTRADIOL 0.15-0.03
1 KIT ORAL DAILY
Qty: 28 TABLET | Refills: 11 | Status: SHIPPED | OUTPATIENT
Start: 2024-08-07 | End: 2024-09-04

## 2024-08-22 ENCOUNTER — OFFICE VISIT (OUTPATIENT)
Dept: OBSTETRICS AND GYNECOLOGY | Facility: CLINIC | Age: 29
End: 2024-08-22
Payer: COMMERCIAL

## 2024-08-22 VITALS
WEIGHT: 166 LBS | DIASTOLIC BLOOD PRESSURE: 73 MMHG | BODY MASS INDEX: 29.41 KG/M2 | SYSTOLIC BLOOD PRESSURE: 111 MMHG | HEIGHT: 63 IN

## 2024-08-22 DIAGNOSIS — Z30.41 SURVEILLANCE FOR BIRTH CONTROL, ORAL CONTRACEPTIVES: ICD-10-CM

## 2024-08-22 DIAGNOSIS — Z01.419 WELL WOMAN EXAM: Primary | ICD-10-CM

## 2024-08-22 DIAGNOSIS — F32.81 PMDD (PREMENSTRUAL DYSPHORIC DISORDER): ICD-10-CM

## 2024-08-22 DIAGNOSIS — Z12.4 CERVICAL CANCER SCREENING: ICD-10-CM

## 2024-08-22 DIAGNOSIS — L68.0 HIRSUTISM: ICD-10-CM

## 2024-08-22 DIAGNOSIS — R63.5 ABNORMAL WEIGHT GAIN: ICD-10-CM

## 2024-08-22 DIAGNOSIS — Z11.3 SCREEN FOR STD (SEXUALLY TRANSMITTED DISEASE): ICD-10-CM

## 2024-08-22 PROCEDURE — 99395 PREV VISIT EST AGE 18-39: CPT | Performed by: OBSTETRICS & GYNECOLOGY

## 2024-08-22 PROCEDURE — 87491 CHLMYD TRACH DNA AMP PROBE: CPT | Performed by: OBSTETRICS & GYNECOLOGY

## 2024-08-22 PROCEDURE — 87661 TRICHOMONAS VAGINALIS AMPLIF: CPT | Performed by: OBSTETRICS & GYNECOLOGY

## 2024-08-22 PROCEDURE — 3008F BODY MASS INDEX DOCD: CPT | Performed by: OBSTETRICS & GYNECOLOGY

## 2024-08-22 RX ORDER — DROSPIRENONE, ETHINYL ESTRADIOL AND LEVOMEFOLATE CALCIUM AND LEVOMEFOLATE CALCIUM 3-0.02(24)
1 KIT ORAL DAILY
Qty: 28 TABLET | Refills: 11 | Status: SHIPPED | OUTPATIENT
Start: 2024-08-22

## 2024-08-22 ASSESSMENT — SOCIAL DETERMINANTS OF HEALTH (SDOH)

## 2024-08-22 ASSESSMENT — PAIN SCALES - GENERAL: PAINLEVEL: 0-NO PAIN

## 2024-08-22 ASSESSMENT — LIFESTYLE VARIABLES
HOW OFTEN DO YOU HAVE SIX OR MORE DRINKS ON ONE OCCASION: NEVER
AUDIT-C TOTAL SCORE: 0
HOW MANY STANDARD DRINKS CONTAINING ALCOHOL DO YOU HAVE ON A TYPICAL DAY: PATIENT DOES NOT DRINK
HOW OFTEN DO YOU HAVE A DRINK CONTAINING ALCOHOL: NEVER
SKIP TO QUESTIONS 9-10: 1

## 2024-08-22 ASSESSMENT — PATIENT HEALTH QUESTIONNAIRE - PHQ9
2. FEELING DOWN, DEPRESSED OR HOPELESS: NOT AT ALL
1. LITTLE INTEREST OR PLEASURE IN DOING THINGS: NOT AT ALL
SUM OF ALL RESPONSES TO PHQ9 QUESTIONS 1 & 2: 0

## 2024-08-22 ASSESSMENT — ENCOUNTER SYMPTOMS
OCCASIONAL FEELINGS OF UNSTEADINESS: 0
DEPRESSION: 0
LOSS OF SENSATION IN FEET: 0

## 2024-08-22 NOTE — PROGRESS NOTES
Patient Name:  Kita Watt  :  1995  MR #:  [id number]  Acct #:  [id number]      Assessment:    1. Well woman exam      2. Cervical cancer screening    - THINPREP PAP TEST  - C. Trachomatis / N. Gonorrhoeae, Amplified Detection  - Trichomonas vaginalis, Nucleic Acid Detection    3. Surveillance for birth control, oral contraceptives    - drospirenone-e.estradioL-lm.FA (Nancy Preston) 3-0.02-0.451 mg (24) (4); Take 1 tablet by mouth once daily.  Dispense: 28 tablet; Refill: 11    4. Screen for STD (sexually transmitted disease)    - THINPREP PAP TEST  - C. Trachomatis / N. Gonorrhoeae, Amplified Detection  - Trichomonas vaginalis, Nucleic Acid Detection    5. PMDD (premenstrual dysphoric disorder)    - drospirenone-e.estradioL-lm.FA (Nancy Preston) 3-0.02-0.451 mg (24) (4); Take 1 tablet by mouth once daily.  Dispense: 28 tablet; Refill: 11    6. Hirsutism    - drospirenone-e.estradioL-lm.FA (Nancy Preston) 3-0.02-0.451 mg (24) (4); Take 1 tablet by mouth once daily.  Dispense: 28 tablet; Refill: 11    7. Abnormal weight gain    - drospirenone-e.estradioL-lm.FA (Nancy Preston) 3-0.02-0.451 mg (24) (4); Take 1 tablet by mouth once daily.  Dispense: 28 tablet; Refill: 11        -PAP every 3 years if normal. Next pap due Now  -STI screening G/C/T obtained.  -Birth control counseling:  Barrier methods of contraception encouraged for BC and for prevention of STIs.   -Preventative hygiene habits.  -Health promoting habits: Exercise ie. aerobics, yoga/pilates, weight bearing exercises 3 to 5 days/week. Advised that muscle weighs more than fat.     Diet ie. Meals with balanced portions of healthy fats, complex carbohydrates, and protein;  Take in adequate calcium and vitamin D3; drink plenty of water each day.   -Medication education:  Education:  All new and/or current medications discussed and reviewed including side effects with patient/caregiver, Understanding:  Caregiver/Patient expressed understanding.,  Adherence:  Barriers to adherence identified and discussed if present.        Chief Complaint:  Annual exam    Subjective:  HPI: 29 yo CF G0 for annual. She does much better on drosperinone OCP vs levonorgestol. Mood is much better.  Exercising 2-3 x/wk.  She doesn't understand her weight gain.           Birth control: OCP - doing well on Beyaz        Cycles: regular lasts 5 days every 28 days    GYNH:   Yes, first onset 12  LMP:  Patient's last menstrual period was 2024 (exact date).  HPV Vaccine Yes - .  Sexual activity Yes - . [place] Yes - .  Orientation: straight  Birth control history: OCP     Past Medical History:   Diagnosis Date    Abnormal Pap smear of cervix     Anxiety     GERD (gastroesophageal reflux disease)     Headache     HPV (human papilloma virus) infection     Laceration of index finger 2024    left    Personal history of other diseases of the respiratory system     History of asthma        History reviewed. No pertinent surgical history.     OB History          0    Para   0    Term   0       0    AB   0    Living   0         SAB   0    IAB   0    Ectopic   0    Multiple   0    Live Births   0                  Prior to Admission medications    Medication Sig Start Date End Date Taking? Authorizing Provider   albuterol 90 mcg/actuation inhaler Albuterol 90 MCG/ACT AERS   Refills: 0       Active   Yes Historical Provider, MD   drospirenone-e.estradioL-lm.FA (Nancy Preston) 3-0.02-0.451 mg (24) (4) Take 1 tablet by mouth once daily. 24  Yes Irene White DO   famotidine (Pepcid) 20 mg tablet 1 tablet at bedtime as needed Orally Once a day for 30 day(s)   Yes Historical Provider, MD   levonorgestreL-ethinyl estrad (Nordette) 0.15-0.03 mg tablet Take 1 tablet by mouth once daily for 28 days. 24  Irene White DO       No Known Allergies    ROS:   [facility] - WOMEN ONLY:          Breast Lump No acute changes.  Hot Flashes Occasional.  Painful [place]  no.  Vaginal Discharge no.      [facility] - ROS Update:          Unexplained Weight Change no.  Pain anywhere in your body no.  Black or bloody stools no.  Problems with urination no.  Rashes or sores no.  Sexual problems no.  Depression or anxiety problems no.  Do you feel threatened by anyone No.        Objective:  Physical Examination:      GENERAL:          General Appearance:  well-developed, well-nourished, no functional handicap, well-groomed.          Hygiene:  good.          Ill-appearance:  none.          Mental Status:  alert and oriented.          Mood/Affect:  pleasant, appropriate.          Speech:  clear.          Eye contact:  normal.          Appears stated age:  yes.          Labs reviewed during visit  yes.          Diagnostic imaging reviewed with patient  yes.      LUNGS:          Effort:  no respiratory distress.      HEART:         HRRR with S1/S2 w/o M/C/R     BREASTS:          General:  no masses, no tenderness, no skin changes, no nipple abnormality, and no axillary lymphadenopathy.      ABDOMEN:          Tenderness:  none.          Distention:  none.      GENITOURINARY - FEMALE:          Bladder:  normal .          Pelvic support defects:  not seen .          External genitalia:  Normal.          Urethra:  Normal.          Vagina:  no lesions, moderate discharge, STI screens collected Yes -. Pap collected         Cervix/ cuff:  normal appearance .          Uterus:  normal size/shape/consistency, non tender.          Adnexa:  normal , non tender.      DERMATOLOGY:          Skin:  normal.      EXTREMITIES:          Normal:  no anomalies.          Edema:  none.      NEUROLOGICAL:          Orientation:  alert and oriented x 3.      PSYCHOLOGY:          Affect:  appropriate.          Mood:  pleasant.

## 2024-08-24 LAB
C TRACH RRNA SPEC QL NAA+PROBE: NEGATIVE
N GONORRHOEA DNA SPEC QL PROBE+SIG AMP: NEGATIVE
T VAGINALIS RRNA SPEC QL NAA+PROBE: NEGATIVE

## 2024-09-04 LAB
CYTOLOGY CMNT CVX/VAG CYTO-IMP: NORMAL
LAB AP CONTRACEPTIVE HISTORY: NORMAL
LAB AP HPV GENOTYPE QUESTION: YES
LAB AP HPV HR: NORMAL
LAB AP PAP ADDITIONAL TESTS: NORMAL
LAB AP PREVIOUS ABNORMAL HISTORY: NORMAL
LABORATORY COMMENT REPORT: NORMAL
PATH REPORT.RELEVANT HX SPEC: NORMAL
PATH REPORT.TOTAL CANCER: NORMAL

## 2025-07-02 ENCOUNTER — APPOINTMENT (OUTPATIENT)
Dept: PRIMARY CARE | Facility: CLINIC | Age: 30
End: 2025-07-02
Payer: COMMERCIAL

## 2025-07-15 ENCOUNTER — APPOINTMENT (OUTPATIENT)
Dept: PRIMARY CARE | Facility: CLINIC | Age: 30
End: 2025-07-15
Payer: COMMERCIAL

## 2025-07-15 VITALS
SYSTOLIC BLOOD PRESSURE: 112 MMHG | DIASTOLIC BLOOD PRESSURE: 77 MMHG | TEMPERATURE: 96.9 F | OXYGEN SATURATION: 91 % | BODY MASS INDEX: 27.03 KG/M2 | HEART RATE: 75 BPM | WEIGHT: 152.6 LBS

## 2025-07-15 DIAGNOSIS — F40.240 CLAUSTROPHOBIA: ICD-10-CM

## 2025-07-15 DIAGNOSIS — Z76.89 ENCOUNTER TO ESTABLISH CARE WITH NEW DOCTOR: ICD-10-CM

## 2025-07-15 DIAGNOSIS — Z13.1 DIABETES MELLITUS SCREENING: ICD-10-CM

## 2025-07-15 DIAGNOSIS — Z00.00 LABORATORY EXAMINATION ORDERED AS PART OF A ROUTINE GENERAL MEDICAL EXAMINATION: ICD-10-CM

## 2025-07-15 DIAGNOSIS — R79.89 ABNORMAL TSH: ICD-10-CM

## 2025-07-15 DIAGNOSIS — R53.83 OTHER FATIGUE: ICD-10-CM

## 2025-07-15 DIAGNOSIS — G43.119 INTRACTABLE MIGRAINE WITH AURA WITHOUT STATUS MIGRAINOSUS: Primary | ICD-10-CM

## 2025-07-15 DIAGNOSIS — Z00.00 ROUTINE GENERAL MEDICAL EXAMINATION AT A HEALTH CARE FACILITY: ICD-10-CM

## 2025-07-15 DIAGNOSIS — E78.00 HIGH CHOLESTEROL: ICD-10-CM

## 2025-07-15 DIAGNOSIS — G44.52 NEW DAILY PERSISTENT HEADACHE: ICD-10-CM

## 2025-07-15 PROBLEM — G43.109 OCULAR MIGRAINE: Status: ACTIVE | Noted: 2025-07-15

## 2025-07-15 PROBLEM — G43.109 OCULAR MIGRAINE: Status: RESOLVED | Noted: 2025-07-15 | Resolved: 2025-07-15

## 2025-07-15 PROCEDURE — 99214 OFFICE O/P EST MOD 30 MIN: CPT | Performed by: STUDENT IN AN ORGANIZED HEALTH CARE EDUCATION/TRAINING PROGRAM

## 2025-07-15 PROCEDURE — 99395 PREV VISIT EST AGE 18-39: CPT | Performed by: STUDENT IN AN ORGANIZED HEALTH CARE EDUCATION/TRAINING PROGRAM

## 2025-07-15 PROCEDURE — 1036F TOBACCO NON-USER: CPT | Performed by: STUDENT IN AN ORGANIZED HEALTH CARE EDUCATION/TRAINING PROGRAM

## 2025-07-15 RX ORDER — CLONAZEPAM 0.5 MG/1
0.5 TABLET ORAL ONCE
Qty: 1 TABLET | Refills: 0 | Status: SHIPPED | OUTPATIENT
Start: 2025-07-15 | End: 2025-07-15

## 2025-07-15 RX ORDER — SUMATRIPTAN SUCCINATE 50 MG/1
50 TABLET ORAL ONCE AS NEEDED
Qty: 27 TABLET | Refills: 3 | Status: SHIPPED | OUTPATIENT
Start: 2025-07-15 | End: 2026-07-15

## 2025-07-15 ASSESSMENT — ENCOUNTER SYMPTOMS
DIZZINESS: 0
WHEEZING: 0
VOMITING: 0
COUGH: 0
ABDOMINAL PAIN: 0
ADENOPATHY: 0
DIARRHEA: 0
OCCASIONAL FEELINGS OF UNSTEADINESS: 0
COLOR CHANGE: 0
SHORTNESS OF BREATH: 0
CHILLS: 0
DEPRESSION: 0
HEADACHES: 0
LOSS OF SENSATION IN FEET: 0
NAUSEA: 0
CONSTIPATION: 0
FATIGUE: 0
FEVER: 0
DIFFICULTY URINATING: 0

## 2025-07-15 ASSESSMENT — PATIENT HEALTH QUESTIONNAIRE - PHQ9
1. LITTLE INTEREST OR PLEASURE IN DOING THINGS: NOT AT ALL
2. FEELING DOWN, DEPRESSED OR HOPELESS: NOT AT ALL
SUM OF ALL RESPONSES TO PHQ9 QUESTIONS 1 AND 2: 0

## 2025-07-15 NOTE — PATIENT INSTRUCTIONS
Please make a follow up appointment in  1 month for your: headache    Or follow up with us if any new, changing, or concerning symptoms occur!    If you had lab work ordered today, please complete it at your earliest convenience. There is a cholesterol test ordered with your lab work, please make sure to get it fasting (no snacks after midnight then complete at 8 am the next day. Plain coffee, tea or water is ok! No milk or sugar in your morning beverage!) Please don't take any biotin supplements before your lab test. You do not need to bring in any paperwork to get this blood work done. You can walk-in or make an appointment at any  [facility] lab location. Your list of tests on your summary are for your personal records. You can request a copay estimate from the  on the labs you are ordered.    If you had referrals placed today, you can call the scheduling phone number on your after visit summary to schedule which is: [phone]. Referrals include your preventative health screening tests such as colonoscopy, mammograms, or bone density scans. If you have any questions about billing or fees for this or other office appointments, please be advised that you will need to contact the billing department.

## 2025-07-15 NOTE — PROGRESS NOTES
Put in a Rx for 1 time dose of klonopin for claustrophobia for her MRI. Pt given written and verbal instructions. Take 30mins before procedure, do not operate heavy machinery or drive, do not mix with other sedating medications.    PDMP reviewed, risk and benefit of medication assessed, benefit of one time dose outweighs the risk. Pt does not have not red flag history of abuse or diversion and is not on any contraindicated medications.

## 2025-07-15 NOTE — PROGRESS NOTES
[facility]  [address], [address]  [place] [address]    Subjective     Patient ID: Kita Watt is a 29 y.o. female who presents for  Establish Care     Headaches. Had this issue since she was a child. She has frequent headaches. Now she is having more frequent headaches for the past week, they have been progressively worse over the month. She thinks that it is from stress. She can sleep and it will go away. But she is getting them in the morning and all day now for the past 7 days. She tried OTC meds. She has also had sx such as passing out and visual disturbance. She was once dx with ocular migraines. In the past was on a epilepsy med for it and a muscle relaxer. Describes all over the head and in the back. Gets some blurry vision but it comes back to normal.     Thyroid concerns. Having some mood issues. Ast time she had some fatigue, low mood she had a thyroid dysfunction. She was on meds for this a long time ago.     At one time was on lexapro but gained 40 lbs. She was taken off this medication. She was then found to have low thyroid levels.        Review of Systems   Constitutional:  Negative for chills, fatigue and fever.   HENT:  Negative for congestion.    Eyes:  Negative for visual disturbance.   Respiratory:  Negative for cough, shortness of breath and wheezing.    Cardiovascular:  Negative for chest pain.   Gastrointestinal:  Negative for abdominal pain, constipation, diarrhea, nausea and vomiting.   Genitourinary:  Negative for difficulty urinating.   Musculoskeletal:  Negative for gait problem.   Skin:  Negative for color change.   Neurological:  Negative for dizziness, syncope and headaches.   Hematological:  Negative for adenopathy.       Social History[1]  Family History[2]  RX Allergies[3]   Current Medications[4]    /77 (BP Location: Right arm, Patient Position: Sitting, BP Cuff Size: Adult)   Pulse 75   Temp 36.1 °C (96.9 °F) (Temporal)   Wt 69.2  kg (152 lb 9.6 oz)   SpO2 91%   BMI 27.03 kg/m²      Objective   Physical Exam  Vitals reviewed.   Constitutional:       General: She is not in acute distress.     Appearance: Normal appearance. She is not ill-appearing, toxic-appearing or diaphoretic.   HENT:      Head: Normocephalic and atraumatic.      Right Ear: Tympanic membrane, ear canal and external ear normal. There is no impacted cerumen.      Left Ear: Tympanic membrane, ear canal and external ear normal. There is no impacted cerumen.      Nose: Nose normal. No congestion.      Mouth/Throat:      Mouth: Mucous membranes are moist.      Pharynx: Oropharynx is clear. No oropharyngeal exudate or posterior oropharyngeal erythema.   Eyes:      General:         Right eye: No discharge.         Left eye: No discharge.      Extraocular Movements: Extraocular movements intact.      Conjunctiva/sclera: Conjunctivae normal.      Pupils: Pupils are equal, round, and reactive to light.   Neck:      Comments: No thyromegaly  Cardiovascular:      Rate and Rhythm: Normal rate and regular rhythm.      Pulses: Normal pulses.      Heart sounds: Normal heart sounds. No murmur heard.     No friction rub. No gallop.   Pulmonary:      Effort: Pulmonary effort is normal. No respiratory distress.      Breath sounds: Normal breath sounds. No stridor. No wheezing, rhonchi or rales.   Chest:      Chest wall: No tenderness.   Abdominal:      General: Abdomen is flat. Bowel sounds are normal. There is no distension.      Palpations: Abdomen is soft. There is no mass.      Tenderness: There is no abdominal tenderness. There is no guarding or rebound.      Hernia: No hernia is present.   Musculoskeletal:         General: No swelling or tenderness. Normal range of motion.      Cervical back: Normal range of motion and neck supple. No rigidity or tenderness.      Right lower leg: No edema.      Left lower leg: No edema.   Lymphadenopathy:      Cervical: No cervical adenopathy.   Skin:    "  General: Skin is warm and dry.      Coloration: Skin is not jaundiced or pale.   Neurological:      General: No focal deficit present.      Mental Status: She is alert and oriented to person, place, and time.      Cranial Nerves: No cranial nerve deficit.      Sensory: No sensory deficit.      Motor: No weakness.      Coordination: Coordination normal.      Gait: Gait normal.      Deep Tendon Reflexes: Reflexes normal.   Psychiatric:         Mood and Affect: Mood normal.         Behavior: Behavior normal.         Thought Content: Thought content normal.         Judgment: Judgment normal.           Labs:   Lab Results   Component Value Date    WBC 5.5 11/04/2019    HGB 12.7 11/04/2019    HCT 36.6 11/04/2019     11/04/2019    TSH 1.76 09/23/2019     Lab Results   Component Value Date     11/04/2019    K 3.5 11/04/2019     11/04/2019    BUN 8 11/04/2019    CREATININE 0.90 11/04/2019    GLUCOSE 99 11/04/2019    CALCIUM 9.5 11/04/2019    PROT 7.5 09/23/2019    BILITOT 1.7 (H) 09/23/2019    ALKPHOS 50 09/23/2019    AST 12 09/23/2019    ALT 10 09/23/2019     Lab Results   Component Value Date    CHOL 188 09/23/2019      Lab Results   Component Value Date    TRIG 75 09/23/2019      Lab Results   Component Value Date    HDL 50.3 09/23/2019     No results found for: \"LDLCALC\"   Lab Results   Component Value Date    VLDL 15 09/23/2019    No components found for: \"CHOLHDLRATI0\"    No data recorded    Imaging/Testing: US abdomen limited liver  Narrative: Interpreted By:  Zheng Monroy,   STUDY:  US ABDOMEN LIMITED LIVER;  5/6/2024 8:04 am      INDICATION:  Signs/Symptoms:Z76.89.      COMPARISON:  None.      ACCESSION NUMBER(S):  [id number]      ORDERING CLINICIAN:  HEAVENLY MARS      TECHNIQUE:  Multiple images of the right upper quadrant were obtained.      FINDINGS:  LIVER:  The liver measures 15.0 cm and is grossly unremarkable and free of  any focal lesions.          GALLBLADDER:  The gallbladder is " "nondistended, and demonstrates no evidence of  gallstones, wall thickening or surrounding fluid. The gallbladder  wall thickness is 1.7 mm. Sonographic Perkins's sign is negative.          BILE DUCTS:  No evidence of intra or extrahepatic biliary dilatation is  identified; the common bile duct measures 2.3 mm.      PANCREAS:  The visualized pancreas is unremarkable in appearance.      RIGHT KIDNEY:  The right kidney measures 10.8 cm in length. The renal cortical  echogenicity and thickness are within normal limit.  No  hydronephrosis or renal calculi are seen.      Impression: Grossly unremarkable right upper quadrant ultrasound.      MACRO:  None      Signed by: Zheng Monroy 5/7/2024 6:09 AM  Dictation workstation:   [id number]    Assessment/Plan   Problem List Items Addressed This Visit       Other fatigue    - check labs  - possibly 2/2 thyroid         Relevant Orders    Comprehensive Metabolic Panel    CBC and Auto Differential    Hemoglobin A1C    High cholesterol    - recheck lipids  - likely 2/2 possible thyroid dysfunction + lifestyle         Relevant Orders    Lipid Panel    Abnormal TSH    - having sx mood changes, fatigue, cold  - check TSH  - would have low threshold to treat dt sx         Relevant Orders    TSH    T4, free    Intractable migraine with aura without status migrainosus - Primary    - chronic issue, initial encounter  - no improvement with OTC  - trial imitrex, if not relief can try another \"triptan\" then nurtec  - continue sleep hygiene, stress relief  - may need ppx medication if prn ineffective         Relevant Medications    SUMAtriptan (Imitrex) 50 mg tablet     Other Visit Diagnoses         New daily persistent headache        Relevant Orders    MR brain wo IV contrast      Laboratory examination ordered as part of a routine general medical examination          Diabetes mellitus screening        Relevant Orders    Hemoglobin A1C      Encounter to establish care with new doctor        "   Routine general medical examination at a health care facility              [facility]  - acute issue, uncontrolled, initial encounter  - has hx of migraines, but headache freq and severity has changed  - in the AM and all throught the day, not getting better with sleep  - has hx of concussions in the past  - concern for mass vs bleed vs neuro change  - MRI as above  - if worsen, with neuro sx - go to ER    As part of today's Preventative Visit, an age and gender-appropriate history and physical was performed, as documented below. Counseling and anticipatory guidance were performed, and risk factor reduction interventions (including [place] Preventative Services Task Force recommended screening tests) were utilized/ordered as outlined in the above Assessment and Plan. All patient medications were reviewed, and refilled if necessary. Patient and I discussed diet/nutrition, lifestyle modifications, safety, medication indications and side effects, and health goals.    current treatment plan is effective, no change in therapy, orders and follow up as documented in EMR, lab results reviewed with patient, repeat labs ordered prior to next appointment, reviewed compliance with lifestyle measures, reviewed diet, exercise and weight control, reviewed medications and side effects in detail     Return visit in 1 months.  Migraine headache  Thyroid  dizziness         BERKLEY AMATO MD, [facility]  Department of Family Medicine of [facility] - Primary Care         [1]   Social History  Tobacco Use    Smoking status: Never    Smokeless tobacco: Never   Vaping Use    Vaping status: Never Used   Substance Use Topics    Alcohol use: Never    Drug use: Not Currently     Types: Marijuana     Comment: Recreationally. Maybe once every 2 weeks   [2]   Family History  Problem Relation Name Age of Onset    Hyperlipidemia Mother Rachel     No Known Problems Sister      No Known Problems Brother      Breast cancer  Maternal Grandmother Grandma silvia     Arthritis Maternal Grandmother Grandma silvia     COPD Maternal Grandmother Grandma silvia     Breast cancer Maternal Great-Grandmother      Breast cancer Other          grandmother and great aunt    Cancer Paternal Grandfather Grandpa caron     Cancer Other Paternal grandmother 80 - 99   [3] No Known Allergies  [4]   Current Outpatient Medications   Medication Sig Dispense Refill    albuterol 90 mcg/actuation inhaler Albuterol 90 MCG/ACT AERS   Refills: 0       Active      drospirenone-e.estradioL-lm.FA (Nancy Preston) 3-0.02-0.451 mg (24) (4) Take 1 tablet by mouth once daily. 28 tablet 11    famotidine (Pepcid) 20 mg tablet 1 tablet at bedtime as needed Orally Once a day for 30 day(s)      SUMAtriptan (Imitrex) 50 mg tablet Take 1 tablet (50 mg) by mouth 1 time if needed for migraine. May repeat after 2 hours. 27 tablet 3     No current facility-administered medications for this visit.

## 2025-07-15 NOTE — ASSESSMENT & PLAN NOTE
"- chronic issue, initial encounter  - no improvement with OTC  - trial imitrex, if not relief can try another \"triptan\" then nurtec  - continue sleep hygiene, stress relief  - may need ppx medication if prn ineffective  "

## 2025-07-16 LAB
ALBUMIN SERPL-MCNC: 4.3 G/DL (ref 3.6–5.1)
ALP SERPL-CCNC: 65 U/L (ref 31–125)
ALT SERPL-CCNC: 8 U/L (ref 6–29)
ANION GAP SERPL CALCULATED.4IONS-SCNC: 9 MMOL/L (CALC) (ref 7–17)
AST SERPL-CCNC: 13 U/L (ref 10–30)
BASOPHILS # BLD AUTO: 22 CELLS/UL (ref 0–200)
BASOPHILS NFR BLD AUTO: 0.5 %
BILIRUB SERPL-MCNC: 0.7 MG/DL (ref 0.2–1.2)
BUN SERPL-MCNC: 12 MG/DL (ref 7–25)
CALCIUM SERPL-MCNC: 9.6 MG/DL (ref 8.6–10.2)
CHLORIDE SERPL-SCNC: 103 MMOL/L (ref 98–110)
CHOLEST SERPL-MCNC: 235 MG/DL
CHOLEST/HDLC SERPL: 3.1 (CALC)
CO2 SERPL-SCNC: 26 MMOL/L (ref 20–32)
CREAT SERPL-MCNC: 0.77 MG/DL (ref 0.5–0.96)
EGFRCR SERPLBLD CKD-EPI 2021: 107 ML/MIN/1.73M2
EOSINOPHIL # BLD AUTO: 22 CELLS/UL (ref 15–500)
EOSINOPHIL NFR BLD AUTO: 0.5 %
ERYTHROCYTE [DISTWIDTH] IN BLOOD BY AUTOMATED COUNT: 12.4 % (ref 11–15)
EST. AVERAGE GLUCOSE BLD GHB EST-MCNC: 108 MG/DL
EST. AVERAGE GLUCOSE BLD GHB EST-SCNC: 6 MMOL/L
GLUCOSE SERPL-MCNC: 83 MG/DL (ref 65–99)
HBA1C MFR BLD: 5.4 %
HCT VFR BLD AUTO: 39.7 % (ref 35–45)
HDLC SERPL-MCNC: 77 MG/DL
HGB BLD-MCNC: 12.8 G/DL (ref 11.7–15.5)
LDLC SERPL CALC-MCNC: 130 MG/DL (CALC)
LYMPHOCYTES # BLD AUTO: 1303 CELLS/UL (ref 850–3900)
LYMPHOCYTES NFR BLD AUTO: 30.3 %
MCH RBC QN AUTO: 29.5 PG (ref 27–33)
MCHC RBC AUTO-ENTMCNC: 32.2 G/DL (ref 32–36)
MCV RBC AUTO: 91.5 FL (ref 80–100)
MONOCYTES # BLD AUTO: 310 CELLS/UL (ref 200–950)
MONOCYTES NFR BLD AUTO: 7.2 %
NEUTROPHILS # BLD AUTO: 2645 CELLS/UL (ref 1500–7800)
NEUTROPHILS NFR BLD AUTO: 61.5 %
NONHDLC SERPL-MCNC: 158 MG/DL (CALC)
PLATELET # BLD AUTO: 271 THOUSAND/UL (ref 140–400)
PMV BLD REES-ECKER: 10.9 FL (ref 7.5–12.5)
POTASSIUM SERPL-SCNC: 4.2 MMOL/L (ref 3.5–5.3)
PROT SERPL-MCNC: 7.5 G/DL (ref 6.1–8.1)
RBC # BLD AUTO: 4.34 MILLION/UL (ref 3.8–5.1)
SODIUM SERPL-SCNC: 138 MMOL/L (ref 135–146)
T4 FREE SERPL-MCNC: 1.2 NG/DL (ref 0.8–1.8)
TRIGL SERPL-MCNC: 163 MG/DL
TSH SERPL-ACNC: 4.83 MIU/L
WBC # BLD AUTO: 4.3 THOUSAND/UL (ref 3.8–10.8)

## 2025-07-24 ENCOUNTER — TELEMEDICINE (OUTPATIENT)
Dept: PRIMARY CARE | Facility: CLINIC | Age: 30
End: 2025-07-24
Payer: COMMERCIAL

## 2025-07-24 DIAGNOSIS — E03.9 HYPOTHYROIDISM, UNSPECIFIED TYPE: Primary | ICD-10-CM

## 2025-07-24 PROCEDURE — 99214 OFFICE O/P EST MOD 30 MIN: CPT | Performed by: STUDENT IN AN ORGANIZED HEALTH CARE EDUCATION/TRAINING PROGRAM

## 2025-07-24 RX ORDER — LEVOTHYROXINE SODIUM 25 UG/1
25 TABLET ORAL DAILY
Qty: 30 TABLET | Refills: 11 | Status: SHIPPED | OUTPATIENT
Start: 2025-07-24 | End: 2026-07-24

## 2025-07-24 ASSESSMENT — ENCOUNTER SYMPTOMS
SORE THROAT: 0
VOICE CHANGE: 0
CHILLS: 0
PALPITATIONS: 0
COUGH: 0
SHORTNESS OF BREATH: 0
TROUBLE SWALLOWING: 0
WHEEZING: 0
ACTIVITY CHANGE: 0
UNEXPECTED WEIGHT CHANGE: 0
APPETITE CHANGE: 0
FEVER: 0

## 2025-07-24 NOTE — PROGRESS NOTES
Formerly named [facility]  [address], [address]  [place] [address]    Subjective     Patient ID: Kita Watt is a 29 y.o. female who presents for  Thyroid Problem     Telehealth Disclaimer: I performed this visit using real-time telehealth tools, including an audio/video connection between patient, who was located at his/her residence in the [facility] and myself in my  office in the [facility]. Verbal consent was obtained from the patient.  Patient understands the limitations of the visit, including limitations in physical examination and all issues may not be able to be addressed. We reviewed precautions when to seek immediate in-person medical care. Visit encounter lasted 15 minutes.      Hx of hypothyroidism. Had been taken off meds. Her labs prior were low TSH. Has another low TSH, but borderline high T4. She is symptomatic with low mood, period issues, fatigue, abnormal lipid profile, and hair and skin changes, and bowel issues.         Review of Systems   Constitutional:  Negative for activity change, appetite change, chills, fever and unexpected weight change.   HENT:  Negative for sore throat, trouble swallowing and voice change.    Respiratory:  Negative for cough, shortness of breath and wheezing.    Cardiovascular:  Negative for chest pain, palpitations and leg swelling.       Social History[1]  Family History[2]  RX Allergies[3]   Current Medications[4]    There were no vitals taken for this visit.     Objective   Physical Exam  Constitutional:       Appearance: Normal appearance.     Neurological:      General: No focal deficit present.      Mental Status: She is alert and oriented to person, place, and time.     Psychiatric:         Mood and Affect: Mood normal.         Behavior: Behavior normal.           Labs:   Lab Results   Component Value Date    WBC 4.3 07/15/2025    HGB 12.8 07/15/2025    HCT 39.7 07/15/2025     07/15/2025    TSH 4.83 (H) 07/15/2025  "    Lab Results   Component Value Date     07/15/2025    K 4.2 07/15/2025     07/15/2025    BUN 12 07/15/2025    CREATININE 0.77 07/15/2025    GLUCOSE 83 07/15/2025    CALCIUM 9.6 07/15/2025    PROT 7.5 07/15/2025    BILITOT 0.7 07/15/2025    ALKPHOS 65 07/15/2025    AST 13 07/15/2025    ALT 8 07/15/2025     Lab Results   Component Value Date    CHOL 235 (H) 07/15/2025    CHOL 188 09/23/2019      Lab Results   Component Value Date    TRIG 163 (H) 07/15/2025    TRIG 75 09/23/2019      Lab Results   Component Value Date    HDL 77 07/15/2025    HDL 50.3 09/23/2019     Lab Results   Component Value Date    LDLCALC 130 (H) 07/15/2025      Lab Results   Component Value Date    VLDL 15 09/23/2019    No components found for: \"CHOLHDLRATI0\"    No data recorded    Imaging/Testing: US abdomen limited liver  Narrative: Interpreted By:  Zheng Monroy,   STUDY:  US ABDOMEN LIMITED LIVER;  5/6/2024 8:04 am      INDICATION:  Signs/Symptoms:Z76.89.      COMPARISON:  None.      ACCESSION NUMBER(S):  [id number]      ORDERING CLINICIAN:  HEAVENLY MARS      TECHNIQUE:  Multiple images of the right upper quadrant were obtained.      FINDINGS:  LIVER:  The liver measures 15.0 cm and is grossly unremarkable and free of  any focal lesions.          GALLBLADDER:  The gallbladder is nondistended, and demonstrates no evidence of  gallstones, wall thickening or surrounding fluid. The gallbladder  wall thickness is 1.7 mm. Sonographic Perkins's sign is negative.          BILE DUCTS:  No evidence of intra or extrahepatic biliary dilatation is  identified; the common bile duct measures 2.3 mm.      PANCREAS:  The visualized pancreas is unremarkable in appearance.      RIGHT KIDNEY:  The right kidney measures 10.8 cm in length. The renal cortical  echogenicity and thickness are within normal limit.  No  hydronephrosis or renal calculi are seen.      Impression: Grossly unremarkable right upper quadrant ultrasound.      MACRO:  None    "   Signed by: Zheng Monroy 5/7/2024 6:09 AM  Dictation workstation:   [id number]    Assessment/Plan   Problem List Items Addressed This Visit    None  Visit Diagnoses         Hypothyroidism, unspecified type    -  Primary    Relevant Medications    levothyroxine (Synthroid) 25 mcg tablet    Other Relevant Orders    TSH    T4, free          Hypothyroidism  - chronic uncontrolled issue, subsequent encounter  - her repeat TSH is still low and T4 at higher end of normal, pt is symptomatic  - prior labs from previous PCP noting low TSH  - will treat with synthroid dt sx  - discussed risk and benefit, discussed cardiac s/e and signs to look out for  - recheck TSH T4 in about 6-8 weeks, follow up in person at that time      orders and follow up as documented in EMR, lab results reviewed with patient, repeat labs ordered prior to next appointment, reviewed medications and side effects in detail     Return visit in 6 weeks.  6-8 weeks in person hypothyroid check         BERKLEY AMATO MD, [facility]  Department of Family Medicine of [facility] - Primary Care         [1]   Social History  Tobacco Use    Smoking status: Never    Smokeless tobacco: Never   Vaping Use    Vaping status: Never Used   Substance Use Topics    Alcohol use: Never    Drug use: Not Currently     Types: Marijuana     Comment: Recreationally. Maybe once every 2 weeks   [2]   Family History  Problem Relation Name Age of Onset    Hyperlipidemia Mother Rachel     No Known Problems Sister      No Known Problems Brother      Breast cancer Maternal Grandmother Grandma silvia     Arthritis Maternal Grandmother Grandma silvia     COPD Maternal Grandmother Grandma silvia     Breast cancer Maternal Great-Grandmother      Breast cancer Other          grandmother and great aunt    Cancer Paternal Grandfather Grandpa caron     Cancer Other Paternal grandmother 80 - 99   [3] No Known Allergies  [4]   Current Outpatient Medications   Medication Sig  Dispense Refill    albuterol 90 mcg/actuation inhaler Albuterol 90 MCG/ACT AERS   Refills: 0       Active      clonazePAM (KlonoPIN) 0.5 mg tablet Take 1 tablet (0.5 mg) by mouth 1 time for 1 dose. Take this medication 30 mins before your MRI. Do not operate heavy machinery or drive on this medication, do not mix with any other sedating medications. 1 tablet 0    drospirenone-e.estradioL-lm.FA (Nancy Preston) 3-0.02-0.451 mg (24) (4) Take 1 tablet by mouth once daily. 28 tablet 11    famotidine (Pepcid) 20 mg tablet 1 tablet at bedtime as needed Orally Once a day for 30 day(s)      levothyroxine (Synthroid) 25 mcg tablet Take 1 tablet (25 mcg) by mouth early in the morning.. Take on an empty stomach at the same time each day, either 30 to 60 minutes prior to breakfast 30 tablet 11    SUMAtriptan (Imitrex) 50 mg tablet Take 1 tablet (50 mg) by mouth 1 time if needed for migraine. May repeat after 2 hours. 27 tablet 3     No current facility-administered medications for this visit.

## 2025-08-18 ENCOUNTER — PATIENT MESSAGE (OUTPATIENT)
Dept: OBSTETRICS AND GYNECOLOGY | Facility: CLINIC | Age: 30
End: 2025-08-18
Payer: COMMERCIAL

## 2025-08-18 DIAGNOSIS — F32.81 PMDD (PREMENSTRUAL DYSPHORIC DISORDER): ICD-10-CM

## 2025-08-18 DIAGNOSIS — Z30.41 SURVEILLANCE FOR BIRTH CONTROL, ORAL CONTRACEPTIVES: ICD-10-CM

## 2025-08-18 DIAGNOSIS — R63.5 ABNORMAL WEIGHT GAIN: ICD-10-CM

## 2025-08-18 DIAGNOSIS — L68.0 HIRSUTISM: ICD-10-CM

## 2025-08-19 RX ORDER — DROSPIRENONE, ETHINYL ESTRADIOL AND LEVOMEFOLATE CALCIUM AND LEVOMEFOLATE CALCIUM 3-0.02(24)
1 KIT ORAL DAILY
Qty: 84 TABLET | Refills: 3 | Status: SHIPPED | OUTPATIENT
Start: 2025-08-19

## 2025-09-16 ENCOUNTER — APPOINTMENT (OUTPATIENT)
Dept: PRIMARY CARE | Facility: CLINIC | Age: 30
End: 2025-09-16
Payer: COMMERCIAL